# Patient Record
Sex: FEMALE | Race: BLACK OR AFRICAN AMERICAN | NOT HISPANIC OR LATINO | Employment: UNEMPLOYED | URBAN - METROPOLITAN AREA
[De-identification: names, ages, dates, MRNs, and addresses within clinical notes are randomized per-mention and may not be internally consistent; named-entity substitution may affect disease eponyms.]

---
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
## 2022-04-21 ENCOUNTER — HOSPITAL ENCOUNTER (EMERGENCY)
Facility: HOSPITAL | Age: 22
Discharge: HOME | End: 2022-04-22
Attending: EMERGENCY MEDICINE | Admitting: EMERGENCY MEDICINE
Payer: MEDICARE

## 2022-04-21 DIAGNOSIS — R50.9 FEVER: ICD-10-CM

## 2022-04-21 DIAGNOSIS — N61.0 MASTITIS: Primary | ICD-10-CM

## 2022-04-21 DIAGNOSIS — R11.0 NAUSEA: ICD-10-CM

## 2022-04-21 DIAGNOSIS — N63.20 LEFT BREAST MASS: ICD-10-CM

## 2022-04-21 PROCEDURE — 99284 EMERGENCY DEPT VISIT MOD MDM: CPT | Performed by: EMERGENCY MEDICINE

## 2022-04-21 PROCEDURE — 99283 EMERGENCY DEPT VISIT LOW MDM: CPT

## 2022-04-21 RX ORDER — ACETAMINOPHEN 325 MG/1
650 TABLET ORAL ONCE
Status: COMPLETED | OUTPATIENT
Start: 2022-04-22 | End: 2022-04-22

## 2022-04-21 RX ORDER — CEPHALEXIN 500 MG/1
500 CAPSULE ORAL EVERY 8 HOURS SCHEDULED
Qty: 21 CAPSULE | Refills: 0 | Status: SHIPPED | OUTPATIENT
Start: 2022-04-21 | End: 2022-04-28

## 2022-04-21 RX ORDER — IBUPROFEN 600 MG/1
600 TABLET ORAL ONCE
Status: COMPLETED | OUTPATIENT
Start: 2022-04-22 | End: 2022-04-22

## 2022-04-22 VITALS
OXYGEN SATURATION: 97 % | RESPIRATION RATE: 18 BRPM | DIASTOLIC BLOOD PRESSURE: 85 MMHG | HEART RATE: 104 BPM | TEMPERATURE: 99.6 F | WEIGHT: 89.51 LBS | SYSTOLIC BLOOD PRESSURE: 123 MMHG

## 2022-04-22 RX ADMIN — ACETAMINOPHEN 650 MG: 325 TABLET ORAL at 00:15

## 2022-04-22 RX ADMIN — IBUPROFEN 600 MG: 600 TABLET ORAL at 00:15

## 2022-04-22 NOTE — DISCHARGE INSTRUCTIONS
You may have an infection in your breast, antibiotics prescribed may be helpful, however you may need to have an abscess drained by a breast specialist  Please reach out to them to schedule an appointment  You can take tylenol and motrin at home to help with pain  Use warm compresses to help alleviate pain  If you develop new or worsening symptoms, please return to the Emergency Department for further evaluation

## 2022-04-22 NOTE — ED ATTENDING ATTESTATION
4/21/2022  IGorge MD, saw and evaluated the patient  I have discussed the patient with the resident/non-physician practitioner and agree with the resident's/non-physician practitioner's findings, Plan of Care, and MDM as documented in the resident's/non-physician practitioner's note, except where noted  All available labs and Radiology studies were reviewed  I was present for key portions of any procedure(s) performed by the resident/non-physician practitioner and I was immediately available to provide assistance  At this point I agree with the current assessment done in the Emergency Department  I have conducted an independent evaluation of this patient a history and physical is as follows:  27-year-old female  History of a left nipple piercing  Not currently breastfeeding  Complaining of 1 day history of pain and swelling to the medial aspect of the left breast   Associated with fever, nausea and vomiting  Physical exam there is a tender firm area to the left breast just medial to the nipple  There is no fluctuance  No overlying erythema  Assessment/plan:  Mastitis versus breast abscess  Antibiotics  Follow up with breast surgeon    ED Course         Critical Care Time  Procedures

## 2022-04-22 NOTE — ED PROVIDER NOTES
History  Chief Complaint   Patient presents with    Medical Problem     Patient started today with left breast pain, nausea, and fever  Patient is a 24 y/o F with PMH asthma presenting with left breast pain, nausea, fever  Interview obtained with help from [facility]  service  Patient states this morning around 8am she was in the shower with her toddler who hit her left breast, around 2 hours after that she started with worsening discomfort in the breast  Worse with movement, medial breast most tender  She noted a yellow-symone nipple discharge prior to coming to ED  She is not breast feeding, last breast fed 6 months ago  She also reports around 5pm feeling fever-symone with associated nausea  She did not take her temperature  She denies sick contacts, rhinorrhea, sore throat, cough, chest pain, SOB, vomiting, diarrhea  Prior to Admission Medications   Prescriptions Last Dose Informant Patient Reported? Taking?    Prenatal Vit-Fe Fumarate-FA (Prenatal Complete) 14-0 4 MG TABS   No No   Sig: Take 1 tablet by mouth daily   acetaminophen (TYLENOL) 325 mg tablet   No No   Sig: Take 2 tablets (650 mg total) by mouth every 6 (six) hours as needed for moderate pain   albuterol (PROVENTIL HFA,VENTOLIN HFA) 90 mcg/act inhaler   No No   Sig: Inhale 2 puffs every 6 (six) hours as needed for wheezing or shortness of breath   Patient not taking: Reported on 4/2/2020   aluminum-magnesium hydroxide 200-200 MG/5ML suspension   No No   Sig: Take 15 mL by mouth every 6 (six) hours as needed for indigestion or heartburn   Patient not taking: Reported on 4/2/2020   ergocalciferol (VITAMIN D2) 50,000 units   No No   Sig: Take 1 capsule (50,000 Units total) by mouth once a week   Patient not taking: Reported on 3/13/2021   famotidine (PEPCID) 20 mg tablet   No No   Sig: Take 1 tablet (20 mg total) by mouth 2 (two) times a day   Patient not taking: Reported on 4/2/2020   fluticasone-salmeterol (Giraldo [place]) 250-50 mcg/dose inhaler   No No   Sig: Inhale 1 puff 2 (two) times a day Rinse mouth after use  Patient not taking: Reported on 4/2/2020   norgestimate-ethinyl estradiol (TRINESSA, 28,) 0 18/0 215/0 25 MG-35 MCG per tablet   Yes No   Sig: Take 1 tablet by mouth daily   ondansetron (ZOFRAN) 4 mg tablet   No No   Sig: Take 1 tablet (4 mg total) by mouth every 8 (eight) hours as needed for nausea or vomiting   Patient not taking: Reported on 4/2/2020   ondansetron (ZOFRAN-ODT) 4 mg disintegrating tablet   No No   Sig: Take 1 tablet (4 mg total) by mouth every 8 (eight) hours as needed for nausea or vomiting for up to 7 days      Facility-Administered Medications: None       Past Medical History:   Diagnosis Date    Anemia     Asthma        Past Surgical History:   Procedure Laterality Date    NO PAST SURGERIES         Family History   Problem Relation Age of Onset    Asthma Mother     Asthma Brother      I have reviewed and agree with the history as documented  E-Cigarette/Vaping    E-Cigarette Use Never User      E-Cigarette/Vaping Substances    Nicotine No     THC No     CBD No      Social History     Tobacco Use    Smoking status: Never Smoker    Smokeless tobacco: Never Used   Vaping Use    Vaping Use: Never used   Substance Use Topics    Alcohol use: Never    Drug use: Never        Review of Systems   Constitutional: Positive for fever  Negative for chills  HENT: Negative for ear pain and sore throat  Eyes: Negative for pain and visual disturbance  Respiratory: Negative for cough and shortness of breath  Cardiovascular: Negative for chest pain and palpitations  Gastrointestinal: Negative for abdominal pain and vomiting  Genitourinary: Negative for dysuria and hematuria  Musculoskeletal: Negative for arthralgias and back pain  Skin: Negative for color change and rash  Neurological: Negative for seizures and syncope  All other systems reviewed and are negative        Physical Exam  ED Triage Vitals [04/21/22 2211]   Temperature Pulse Respirations Blood Pressure SpO2   99 6 °F (37 6 °C) (!) 133 18 (!) 173/99 98 %      Temp Source Heart Rate Source Patient Position - Orthostatic VS BP Location FiO2 (%)   Oral Monitor Sitting Right arm --      Pain Score       --             Orthostatic Vital Signs  Vitals:    04/21/22 2211   BP: (!) 173/99   Pulse: (!) 133   Patient Position - Orthostatic VS: Sitting       Physical Exam  Vitals and nursing note reviewed  Exam conducted with a chaperone present  Constitutional:       General: She is not in acute distress  HENT:      Head: Normocephalic and atraumatic  Right Ear: External ear normal       Left Ear: External ear normal       Nose: Nose normal       Mouth/Throat:      Pharynx: Oropharynx is clear  Eyes:      Extraocular Movements: Extraocular movements intact  Pupils: Pupils are equal, round, and reactive to light  Cardiovascular:      Rate and Rhythm: Normal rate and regular rhythm  Pulses: Normal pulses  Heart sounds: Normal heart sounds  No murmur heard  No friction rub  No gallop  Pulmonary:      Effort: Pulmonary effort is normal  No respiratory distress  Breath sounds: Normal breath sounds  No wheezing, rhonchi or rales  Abdominal:      General: Abdomen is flat  There is no distension  Palpations: Abdomen is soft  Tenderness: There is no abdominal tenderness  There is no guarding or rebound  Musculoskeletal:         General: No deformity  Normal range of motion  Cervical back: Normal range of motion  Right lower leg: No edema  Left lower leg: No edema  Skin:     General: Skin is warm and dry  Capillary Refill: Capillary refill takes less than 2 seconds  Findings: No rash  Comments: Left breast with 3x3cm tender, mobile mass 9 o'clock position, relatively superficial  No overlying skin changes  No discharge expressed from nipple      Neurological: General: No focal deficit present  Mental Status: She is alert and oriented to person, place, and time  Gait: Gait normal    Psychiatric:         Mood and Affect: Mood normal          ED Medications  Medications   acetaminophen (TYLENOL) tablet 650 mg (650 mg Oral Given 4/22/22 0015)   ibuprofen (MOTRIN) tablet 600 mg (600 mg Oral Given 4/22/22 0015)       Diagnostic Studies  Results Reviewed     None                 No orders to display         Procedures  Procedures      ED Course                                       MDM  Number of Diagnoses or Management Options  Fever  Left breast mass  Mastitis  Nausea  Diagnosis management comments: 22 y/o F presenting with left breast pain, nausea, fever  Examines with tender breast mass w/o skin changes, possibly breast abscess in setting of fever  Will treat with keflex and refer to breast surgery for further management  Patient advised on discharge instructions and plans, all questions answered with help of  service  Disposition  Final diagnoses:   Mastitis   Fever   Nausea   Left breast mass     Time reflects when diagnosis was documented in both MDM as applicable and the Disposition within this note     Time User Action Codes Description Comment    4/21/2022 11:53 PM Estella Curry Add [N61 0] Mastitis     4/21/2022 11:54 PM Romy Schwartz [R50 9] Fever     4/21/2022 11:54 PM Romy Gills [R11 0] Nausea     4/21/2022 11:56 PM Lawrindy Curry Add [N63 20] Left breast mass       ED Disposition     ED Disposition Condition Date/Time Comment    Discharge Stable Thu Apr 21, 2022 11:57 PM Tunde Olivo discharge to home/self care              Follow-up Information     Follow up With Specialties Details Why Contact Info    Simeon Escobar MD General Surgery, Surgical Oncology, Breast Surgery Call in 1 day  [address] [place] [address]  [phone]            Patient's Medications   Discharge Prescriptions    CEPHALEXIN (KEFLEX) 500 MG CAPSULE    Take 1 capsule (500 mg total) by mouth every 8 (eight) hours for 7 days       Start Date: 4/21/2022 End Date: 4/28/2022       Order Dose: 500 mg       Quantity: 21 capsule    Refills: 0         PDMP Review     None           ED Provider  Attending physically available and evaluated Smith Santos I managed the patient along with the ED Attending      Electronically Signed by         Fredi Luna MD  04/22/22 8051